# Patient Record
(demographics unavailable — no encounter records)

---

## 2024-11-11 NOTE — DISCUSSION/SUMMARY
[de-identified] : Discussed my findings with the patient. Imaging shows a right L5/S1 disc herniation which corresponds to the symptoms she has been experiencing. I am recommending an evaluation with Dr. Armijo for consideration of right L5/S1 TESI. Follow up with me in 2 months, sooner if there is an issue. All questions answered.

## 2024-11-11 NOTE — HISTORY OF PRESENT ILLNESS
[de-identified] : Ms. OCHOA is a very pleasant 47 year old female who complains of long-standing history of intermittent low back pain, usually coincided with menstrual cycle. Symptoms significantly progressed about 4 months ago. Pain now radiating posterolaterally down her right lower extremity to her foot. She has been ambulating with a cane for the past 4 months. She has tried gabapentin, cyclobenzaprine and physical therapy without significant improvement. Current symptoms are significantly disabling.   The patient no history of previous spine surgery.   The patient has no history of unexpected weight loss, no history of active cancer, no history bladder or bowel dysfunction, no night pain, no fevers or chills.   The past medical history, surgical history, family history, allergies, medications, 10+ point review of systems, family history and social history were reviewed and non contributory.

## 2024-11-11 NOTE — HISTORY OF PRESENT ILLNESS
[de-identified] : Ms. OCHOA is a very pleasant 47 year old female who complains of long-standing history of intermittent low back pain, usually coincided with menstrual cycle. Symptoms significantly progressed about 4 months ago. Pain now radiating posterolaterally down her right lower extremity to her foot. She has been ambulating with a cane for the past 4 months. She has tried gabapentin, cyclobenzaprine and physical therapy without significant improvement. Current symptoms are significantly disabling.   The patient no history of previous spine surgery.   The patient has no history of unexpected weight loss, no history of active cancer, no history bladder or bowel dysfunction, no night pain, no fevers or chills.   The past medical history, surgical history, family history, allergies, medications, 10+ point review of systems, family history and social history were reviewed and non contributory.

## 2024-11-11 NOTE — PHYSICAL EXAM
[de-identified] : General: patient is well developed, well nourished, in no acute  distress, alert and oriented x 3.   Mood and affect: normal  Respiratory: no respiratory distress noted  Skin: no scars over spine, skin intact, no erythema, increased warmth  Alignment:The spine is well compensated in the coronal and sagittal plane.  There is no asymmetry on the tai forward bend test  Gait: The patient is able to toe walk and heel walk without difficulty.  Palpation: no tenderness to palpation spine or paraspinal region  Range of motion: Lumbar spine ROM is restricted  Neurologic Exam: Motor: Manual Muscle testing in the lower extremities is 5 out of 5 in all muscle groups. There is no evidence of muscular atrophy in the lower extremities. Sensory: Sensation to light touch is grossly intact in the lower extremities  Reflexes: DTR are present and symmetric throughout, negative martinez bilaterally, negative inverted radial reflex bilaterally, no clonus, plantar responses are flexor  Hip Exam: No pain with internal or external rotation of hips bilaterally  Special tests: Straight leg raise test negative.  Cross straight leg test negative.  GEOVANI test negative  Vascular: Examination of the peripheral vascular system demonstrates no evidence of congestion or edema. no evidence of lymphedema bilateral lower extremities, pulses are present and symmetric in both lower extremities.  [de-identified] : MRI lumbar spine without contrast 9/17/24 (my read): L4/L5 and L5/S1 mild disc degeneration, L4/L5 disc bulge with mild stenosis; L5/S1 right paracentral extruded disc herniation compressing right S1 nerve root  XR full spine ap/lateral, lumbar flexion/extension 11/11/24 (my read): mild L5/S1 disc degeneration, no spondylolisthesis or dynamic instability, no scoliosis, no acute fractures seen

## 2024-11-11 NOTE — END OF VISIT
[FreeTextEntry3] :   This note was written by Vivi Bryan PA-C, acting as a scribe for Dr. Sarath Paul. I, Dr. Sarath Paul, have read and attest that all the information, medical decision-making, and discharge instructions within are true and accurate.  I, Dr. Sarath Paul, personally performed the evaluation and management (E/M) services for this new patient. That E/M includes conducting the initial examination, assessing all conditions, and establishing the plan of care. Today, my ACP, Vivi Bryan PA-C, was here to observe my evaluation and management services for this patient to be followed going forward.

## 2024-11-11 NOTE — PHYSICAL EXAM
[de-identified] : General: patient is well developed, well nourished, in no acute  distress, alert and oriented x 3.   Mood and affect: normal  Respiratory: no respiratory distress noted  Skin: no scars over spine, skin intact, no erythema, increased warmth  Alignment:The spine is well compensated in the coronal and sagittal plane.  There is no asymmetry on the tai forward bend test  Gait: The patient is able to toe walk and heel walk without difficulty.  Palpation: no tenderness to palpation spine or paraspinal region  Range of motion: Lumbar spine ROM is restricted  Neurologic Exam: Motor: Manual Muscle testing in the lower extremities is 5 out of 5 in all muscle groups. There is no evidence of muscular atrophy in the lower extremities. Sensory: Sensation to light touch is grossly intact in the lower extremities  Reflexes: DTR are present and symmetric throughout, negative martinez bilaterally, negative inverted radial reflex bilaterally, no clonus, plantar responses are flexor  Hip Exam: No pain with internal or external rotation of hips bilaterally  Special tests: Straight leg raise test negative.  Cross straight leg test negative.  GEOVANI test negative  Vascular: Examination of the peripheral vascular system demonstrates no evidence of congestion or edema. no evidence of lymphedema bilateral lower extremities, pulses are present and symmetric in both lower extremities.  [de-identified] : MRI lumbar spine without contrast 9/17/24 (my read): L4/L5 and L5/S1 mild disc degeneration, L4/L5 disc bulge with mild stenosis; L5/S1 right paracentral extruded disc herniation compressing right S1 nerve root  XR full spine ap/lateral, lumbar flexion/extension 11/11/24 (my read): mild L5/S1 disc degeneration, no spondylolisthesis or dynamic instability, no scoliosis, no acute fractures seen

## 2024-11-11 NOTE — DISCUSSION/SUMMARY
[de-identified] : Discussed my findings with the patient. Imaging shows a right L5/S1 disc herniation which corresponds to the symptoms she has been experiencing. I am recommending an evaluation with Dr. Armijo for consideration of right L5/S1 TESI. Follow up with me in 2 months, sooner if there is an issue. All questions answered.

## 2024-12-11 NOTE — HISTORY OF PRESENT ILLNESS
[Back] : back [9] : a current pain level of 9/10 [7] : a minimum pain level of 7/10 [10] : a maximum pain level of 10/10 [Aching] : aching [Burning] : burning [Numbness] : numbness [Tingling] : tingling [Weakness] : weakness [Sitting] : sitting [Standing] : standing [Walking] : walking [Medications] : medications [Heat] : heat [Ice] : ice [Stable] : are stable [FreeTextEntry1] : Referring Physician: Dr. Sarath Paul  12/11/2024 Ms. MANAS OCHOA is a very pleasant 47 -year female who comes in for evaluation of Lower Back Pain that has been ongoing for 6 months without any specific injury or inciting event. Patient has tried Gabapentin, Motrin, Cyclobenzaprine, Physical Therapy which did help relief minimally. The pain is located primarily Lower Back Pain radiating to the RIGHT thigh, calf, shin, foot, and toes intermittent and described as throbbing, tight, achy, burning. The pain is rated as 9/10 and ranges from7-10/10. The patient's symptoms are aggravated by sitting, standing, walking, lying down and alleviated by Hot shower, Icing, medications, PT. The patient works for the [employer] which consists of sitting, computer use. The patient experiences night pain, numbness/tingling, weakness. Patient denies bowel/bladder dysfunction. The patient has no other complaints at this time.  ----------------------   - Summary : 47-year-old female, referred by Dr. Paul, presents with ongoing sciatica that began four months ago. The discomfort radiates unsteadily down her right leg. The patient has been using a cane for four months, and has unsuccessfully tried treatments including gabapentin, physical therapy, and cyclobenzaprine. She was referred for evaluation of her right L5-S1 transform and a possible epidural injection. - Chief Complaint (CC) : Persistent, untreated sciatica. - History of Present Illness (HPI) : Symptoms started four months ago, with discomfort radiating irregularly down the patient's right leg. Despite attempts at management using gabapentin, cyclobenzaprine, and physical therapy, her condition has not improved, necessitating the consideration for an epidural injection.

## 2024-12-11 NOTE — PHYSICAL EXAM
[FreeTextEntry1] :  Gen: A+O x 3 in NAD Psych: Normal mood and affect. Responds appropriately to commands Eyes: Anicteric. No discharge. EOMI. Resp: Breathing unlabored CV: Well Perfused Ext: No c/c/e  Skin: No lesions noted   Gait: Non antalgic, normal reciprocating heel to toe, able to stand on toes and heels. Tandem gait intact  Negative romberg   Stance: No Trendelenburg sign present with single leg stance     Neuro:   Tone: Normal. No clonus. Sensation: Grossly intact to light touch and pinprick bilateral lower limbs. Proprioception: Intact at big toes bilaterally. Reflexes: 2+  symmetric knee jerk, medial hamstring, ankle jerk. Plantars downgoing bilaterally.   MMT:   5/5 in b/l lower extremities      Spine:   Inspection: Alignment is midline, with no evidence of scoliosis. Iliac crest heights and PSIS heights level. No rib hump noted upon forward flexion of the trunk.    Palpation: There is + tenderness over the midline spinous processes, paravertebral muscles, PSIS,   Neg TTP over sciatic notch, greater trochanters bilaterally.     Lumbar ROM: Flexion, extension, side-bending, rotation, limited in most planes  pain with lateral flexion  pain with oblique extension  pain with lateral rotation      Hip ROM:   -Pain at terminal ROM bilaterally.   -FAIR, FABERE negative bilaterally.            -SLR positive on the right    -Crossed SLR positive on the right    -Slump test positive on the right    -Femoral Nerve Stretch test negative bilaterally   -Crossed Femoral Nerve test negative bilaterally        -Facet loading (Kemps Test) did not produce low back pain.   -Midline Sacral Thrust did not produce pain.      -Standing Flexion Test positive   -Seated Flexion Test negative   -SIJ pain not  elicited with dropping the involved leg off the table while the contralateral hip was held in flexion (Gaenslens Test)   - Negative Hip extension and ilium rotation (Yeomans Test)   - Negative Iliac Compression and Distraction of the ASIS   -Akira Finger Test to SIJ Negative  on two attempts.

## 2024-12-11 NOTE — ASSESSMENT
[FreeTextEntry1] : Assessment: - Summary : Patient presents with unresolved sciatica despite various treatments, MRI suggested disc is budge at L5-S1 with S1 impingement  - Problems : - Ongoing, untreated sciatica  - Differential Diagnosis : - Sciatica due to disc herniation at the level of L5-S1  Plan: - Summary : Plan includes prescription of Tizanidine to address leg spasms. An epidural injection is proposed, but approval from insurance is required. If epidural injection treatment is unsuccessful after several attempts, referral back to Dr. Paul for potential surgical intervention will be considered; a minimally invasive surgery such as micro disc may be an option.  - Plan : - Prescribe Tizanidine 2-4mg Q6PRn  to manage patient's leg spasms  -Pregabalin 50mg BID   - Request insurance approval for epidural injection for right L5-S1 and S1 foramen, transforaminal epidural steroid injection  - If approved, proceed with the injection and monitor its effectiveness.  - In case of lack of improvement after three injections, refer the patient back to Dr. Paul for potential surgical options  Pain has been present for more than 3 months and is significantly interfering with the patient's ability to participate in ADLs and IADLs as well as enjoying a quality of life. Patient had tried and failed conservative treatment.  This includes but is not limited to: Acetaminophen, NSAIDs, muscle relaxants, neuropathic medications, physician directed home exercises and/or physical therapy for at least 8 weeks. After a thorough discussion of risks and benefits patient would like to proceed with right L5-S1 and S1 foramen.    Risks and benefits of having injection discussed with patient. Risks discussed included, but not limited to: pain, infection, bleeding requiring emergency surgery, headaches, damage to vital organs, etc.    At this time, patient appears to be psychologically stable. Based on the history, physical exam and diagnostic findings, patient will benefit from this procedure. The goal of this procedure is to reduce pain and inflammation, improve function and range of motion and to further promote increased activity and return to previous level of functioning. The ultimate goal of performing this procedure is to improve patient's quality of life.    Asking for authorization for  right L5-S1 and S1 foramen to help treat patients pain.  Patient will be scheduled for this procedure.  Earle Wall DO, FAAPMR Attending Physician, Interventional Pain Medicine  Department of Physical Medicine and Rehabilitation [facility] | [facility] [address] [place] [address] Email: [email or web address]  I have spent greater than 45 minutes preparing to see the patient, collecting relevant history, performing a thorough history and physical examination, counseling the patient regarding my findings ordering the appropriate therapies and tests, communicating with other relevant healthcare professionals, documenting my encounter and coordinating care.

## 2024-12-11 NOTE — REVIEW OF SYSTEMS
[Joint Pain] : joint pain [Joint Stiffness] : joint stiffness [Muscle Pain] : muscle pain [Muscle Weakness] : muscle weakness [Negative] : Heme/Lymph

## 2024-12-11 NOTE — DATA REVIEWED
[MRI] : MRI [FreeTextEntry1] : 	 EXAM:  MRI LUMBAR SPINE WITHOUT CONTRAST  HISTORY: Lower back pain.  TECHNIQUE: Multiplanar, multi-sequential MRI of the lumbar spine was obtained on a 3T scanner using a standard protocol.  COMPARISON:  None  FINDINGS: Transitional anatomy at the lumbosacral junction is referred to partial lumbarization of S1 with rudimentary disc space formation at S1-2. Confirmation of lumbar levels suggested prior to any therapeutic intervention.  For purposes of this dictation, the last well-formed disc space will be labeled L5-S1.  OSSEOUS STRUCTURES: Vertebral body heights are preserved.  No aggressive osseous lesion.  ALIGNMENT: Normal lordosis. No spondylolisthesis.  SPINAL CORD AND CONUS MEDULLARIS: No definite cord signal abnormality.  PARASPINAL AND INTRA-ABDOMINAL SOFT TISSUES: STIR hyperintensity just left lateral of the S1 spinous process may be related to synovial cysts and ongoing degenerative change. If there is clinical suspicion for infectious process, dedicated MR of the lumbar spine  with and without contrast can be obtained as indicated.  DISCS: Mild/moderate loss of disc height and associated degenerative change at L5-S1, mild L4-5.  The following axial levels are imaged and detailed below:  L1-L2: No significant spinal canal or neural foraminal stenosis.  L2-L3: No significant spinal canal or neural foraminal stenosis.  L3-L4: No significant spinal canal or neural foraminal stenosis.  L4-L5: Disc bulge, ligamentum flavum thickening and facet arthropathy cause: Mild spinal canal stenosis. Mild right neural foraminal stenosis. Mild left neural foraminal stenosis.  L5-S1: Disc bulge, central herniation ligamentum flavum thickening and facet arthropathy cause: Mild spinal canal stenosis. Bilateral lateral recess stenosis contacting without definitely compressing the descending nerve roots. Mild right neural foraminal stenosis. Mild left neural foraminal stenosis.  IMPRESSION: Transitional anatomy at the lumbosacral junction is referred to partial lumbarization of S1 with rudimentary disc space formation at S1-2. Confirmation of lumbar levels suggested prior to any therapeutic intervention.  STIR hyperintensity just left lateral of the S1 spinous process may be related to synovial cyst and ongoing degenerative change. If there is clinical suspicion for infectious process, dedicated MR of the lumbar spine  with and without contrast can be obtained as indicated.  L5-S1: Disc bulge, central herniation ligamentum flavum thickening and facet arthropathy cause: Mild spinal canal stenosis. Bilateral lateral recess stenosis contacting without definitely compressing the descending nerve roots. Mild right neural foraminal stenosis. Mild left neural foraminal stenosis.

## 2025-01-06 NOTE — PHYSICAL EXAM
[de-identified] : General: patient is well developed, well nourished, in no acute  distress, alert and oriented x 3.   Mood and affect: normal  Respiratory: no respiratory distress noted  Skin: no scars over spine, skin intact, no erythema, increased warmth  Alignment:The spine is well compensated in the coronal and sagittal plane.  There is no asymmetry on the tai forward bend test  Gait: The patient is able to toe walk and heel walk without difficulty.  Palpation: no tenderness to palpation spine or paraspinal region  Range of motion: Lumbar spine ROM is restricted  Neurologic Exam: Motor: Manual Muscle testing in the lower extremities is 5 out of 5 in all muscle groups. There is no evidence of muscular atrophy in the lower extremities. Sensory: Sensation to light touch is grossly intact in the lower extremities  Reflexes: DTR are present and symmetric throughout, negative martinez bilaterally, negative inverted radial reflex bilaterally, no clonus, plantar responses are flexor  Hip Exam: No pain with internal or external rotation of hips bilaterally  Special tests: Straight leg raise test negative.  Cross straight leg test negative.  GEOVANI test negative  Vascular: Examination of the peripheral vascular system demonstrates no evidence of congestion or edema. no evidence of lymphedema bilateral lower extremities, pulses are present and symmetric in both lower extremities.  [de-identified] : MRI lumbar spine without contrast 9/17/24 (my read): L4/L5 and L5/S1 mild disc degeneration, L4/L5 disc bulge with mild stenosis; L5/S1 right paracentral extruded disc herniation compressing right S1 nerve root  XR full spine ap/lateral, lumbar flexion/extension 11/11/24 (my read): mild L5/S1 disc degeneration, no spondylolisthesis or dynamic instability, no scoliosis, no acute fractures seen

## 2025-01-06 NOTE — DISCUSSION/SUMMARY
[de-identified] : Mildly improved lumbar radiculopathy from lumbar herniated disc L5/S1, still quite bothersome, using cane would benefit greatly from AMINAH, approved insurance wise patient will schedule this with dr. mims f/u with me in 6 weeks, discussed surgical option of microdiscectomy if approach with AMINAH fails

## 2025-01-06 NOTE — HISTORY OF PRESENT ILLNESS
[de-identified] : Follow up 1/6/25: Patient presents for follow up. Still with low back and right lower extremity pain. She reports improvement with muscle relaxants since last visit. Had to stop pregabalin due to side effects. Saw Dr. Armijo and is now approved for AMINAH, has not yet scheduled. No new neurologic symptoms since last visit.   Initial 11/11/24: Ms. OCHOA is a very pleasant 47 year old female who complains of long-standing history of intermittent low back pain, usually coincided with menstrual cycle. Symptoms significantly progressed about 4 months ago. Pain now radiating posterolaterally down her right lower extremity to her foot. She has been ambulating with a cane for the past 4 months. She has tried gabapentin, cyclobenzaprine and physical therapy without significant improvement. Current symptoms are significantly disabling.  The patient no history of previous spine surgery.  The patient has no history of unexpected weight loss, no history of active cancer, no history bladder or bowel dysfunction, no night pain, no fevers or chills.  The past medical history, surgical history, family history, allergies, medications, 10+ point review of systems, family history and social history were reviewed and non contributory.

## 2025-01-25 NOTE — PROCEDURE
[de-identified] :  Procedure Note, [facility]  Preoperative Diagnosis: Right L5-S1 Radiculopathy  Postoperative Diagnosis: Right L5-S1 Radiculopathy  Procedure: 1.Right L5-S1 transforaminal epidural steroid injection. 2. Fluoroscopy for needle guidance. 3. Lumbar epidurography.  Surgeon/Staff: Earle Wall DO Assistant: None  Anesthesia: Lidocaine 1%  Indications: The patient c/o radiating symptoms in a Right L5-S1 distribution. Patient's history and physical findings are consistent with Right L5-S1 lumbar radiculopathy. They are here for an injection at the site thought to be the source of their pain.  Procedure in detail: Written informed consent was obtained. Risks and benefits were explained to the patient including bleeding infection nerve damage and damage of adjacent structures. The chart was reviewed, questions were answered and the patient wished to proceed. The patient had no contraindications to the procedure. Vital signs were stable. Standard monitoring was applied. The patient, the procedure nurse, and the physician confirmed the site of injection after an official "time out."  Localization Time Out: An additional intraoperative timeout, specifically to confirm accurate localization, was conducted by the attending physician.  The patient remained awake and alert throughout the procedure. The patient was placed in the prone position. The skin was prepped with chlorhexidine and sterile drapes were applied. The skin and soft tissues were anesthetized with Lidocaine 1%.  A 22G 3.5 inch Quincke needle was inserted percutaneously and advanced under fluoroscopic guidance using dorsal oblique, AP, and lateral projections. The needle tip entered the neural foramen at the Right L5-S1 and S1 space to rest in the anterior epidural space. No reproduction of radicular symptoms down the leg reported by the patient on entry. Epidurography and radiographic interpretation: Epidurography was performed by injection of Omnipaque 300 under live fluoroscopy. Multiple Xray images were obtained. Radiologic examination confirmed proper spread of the contrast medium within the epidural space. There was no evidence of intrathecal or intravascular runoff. Images uploaded to PACS.   After negative aspiration.5cc dexamethasone 10mg/cc and 2cc 1% lidocaine mpf was injected at each level  The patient was transferred to the recovery area. Post operative instructions were explained and given to the patient. The patient was discharged in stable health and given appropriate follow up.

## 2025-04-10 NOTE — HISTORY OF PRESENT ILLNESS
[Back] : back [5] : a current pain level of 5/10 [7] : a minimum pain level of 7/10 [8] : a maximum pain level of 8/10

## 2025-07-22 NOTE — HISTORY OF PRESENT ILLNESS
[Back] : back [5] : a minimum pain level of 5/10 [8] : a maximum pain level of 8/10 [FreeTextEntry1] : Patient presents today for follow-up overall doing well continuing physical therapy.

## 2025-07-22 NOTE — ASSESSMENT
[FreeTextEntry1] : Patient with right sided L5-S1 radiculopathy presents today for follow-up.  She did very well with her L5-S1 transforaminal epidural steroid injection in January followed by physical therapy follow-up as needed.  Earle Wall DO, FAAPMR Attending Physician, Interventional Pain Medicine  Department of Physical Medicine and Rehabilitation [facility] | [place] [address] [address] [place] [address] [address] [place] [address] Email: [email or web address]  I have spent 31 minutes preparing to see the patient, collecting relevant history, performing a thorough history and physical examination, counseling the patient regarding my findings ordering the appropriate therapies and tests, communicating with other relevant healthcare professionals, documenting my encounter and coordinating care. Time spent excludes teaching time and/or separately reported services.

## 2025-07-22 NOTE — ASSESSMENT
[FreeTextEntry1] : Patient with right sided L5-S1 radiculopathy presents today for follow-up.  She did very well with her L5-S1 transforaminal epidural steroid injection in January followed by physical therapy follow-up as needed.  Earle Wall DO, FAAPMR Attending Physician, Interventional Pain Medicine  Department of Physical Medicine and Rehabilitation [facility] | [place] [address] [address] [place] [address] [address] [place] [address] Email: [email or web address]  I have spent 31 minutes preparing to see the patient, collecting relevant history, performing a thorough history and physical examination, counseling the patient regarding my findings ordering the appropriate therapies and tests, communicating with other relevant healthcare professionals, documenting my encounter and coordinating care. Time spent excludes teaching time and/or separately reported services.

## 2025-07-22 NOTE — DATA REVIEWED
[Plain X-Rays] : plain X-Rays [MRI] : MRI [FreeTextEntry1] : 	 EXAM:  MRI LUMBAR SPINE WITHOUT CONTRAST  HISTORY: Lower back pain.  TECHNIQUE: Multiplanar, multi-sequential MRI of the lumbar spine was obtained on a 3T scanner using a standard protocol.  COMPARISON:  None  FINDINGS: Transitional anatomy at the lumbosacral junction is referred to partial lumbarization of S1 with rudimentary disc space formation at S1-2. Confirmation of lumbar levels suggested prior to any therapeutic intervention.  For purposes of this dictation, the last well-formed disc space will be labeled L5-S1.  OSSEOUS STRUCTURES: Vertebral body heights are preserved.  No aggressive osseous lesion.  ALIGNMENT: Normal lordosis. No spondylolisthesis.  SPINAL CORD AND CONUS MEDULLARIS: No definite cord signal abnormality.  PARASPINAL AND INTRA-ABDOMINAL SOFT TISSUES: STIR hyperintensity just left lateral of the S1 spinous process may be related to synovial cysts and ongoing degenerative change. If there is clinical suspicion for infectious process, dedicated MR of the lumbar spine  with and without contrast can be obtained as indicated.  DISCS: Mild/moderate loss of disc height and associated degenerative change at L5-S1, mild L4-5.  The following axial levels are imaged and detailed below:  L1-L2: No significant spinal canal or neural foraminal stenosis.  L2-L3: No significant spinal canal or neural foraminal stenosis.  L3-L4: No significant spinal canal or neural foraminal stenosis.  L4-L5: Disc bulge, ligamentum flavum thickening and facet arthropathy cause: Mild spinal canal stenosis. Mild right neural foraminal stenosis. Mild left neural foraminal stenosis.  L5-S1: Disc bulge, central herniation ligamentum flavum thickening and facet arthropathy cause: Mild spinal canal stenosis. Bilateral lateral recess stenosis contacting without definitely compressing the descending nerve roots. Mild right neural foraminal stenosis. Mild left neural foraminal stenosis.  IMPRESSION: Transitional anatomy at the lumbosacral junction is referred to partial lumbarization of S1 with rudimentary disc space formation at S1-2. Confirmation of lumbar levels suggested prior to any therapeutic intervention.  STIR hyperintensity just left lateral of the S1 spinous process may be related to synovial cyst and ongoing degenerative change. If there is clinical suspicion for infectious process, dedicated MR of the lumbar spine  with and without contrast can be obtained as indicated.  L5-S1: Disc bulge, central herniation ligamentum flavum thickening and facet arthropathy cause: Mild spinal canal stenosis. Bilateral lateral recess stenosis contacting without definitely compressing the descending nerve roots. Mild right neural foraminal stenosis.